# Patient Record
Sex: FEMALE | HISPANIC OR LATINO | URBAN - METROPOLITAN AREA
[De-identification: names, ages, dates, MRNs, and addresses within clinical notes are randomized per-mention and may not be internally consistent; named-entity substitution may affect disease eponyms.]

---

## 2019-02-14 ENCOUNTER — OFFICE VISIT (OUTPATIENT)
Dept: URBAN - METROPOLITAN AREA CLINIC 88 | Facility: CLINIC | Age: 84
End: 2019-02-14
Payer: COMMERCIAL

## 2019-02-14 DIAGNOSIS — H35.363 DRUSEN (DEGENERATIVE) OF MACULA, BILATERAL: ICD-10-CM

## 2019-02-14 PROCEDURE — 99214 OFFICE O/P EST MOD 30 MIN: CPT | Performed by: OPHTHALMOLOGY

## 2019-02-14 ASSESSMENT — INTRAOCULAR PRESSURE
OD: 14
OS: 14

## 2019-02-14 ASSESSMENT — KERATOMETRY
OS: 43.88
OD: 44.13

## 2019-02-14 NOTE — IMPRESSION/PLAN
Impression: Puckering of macula, right eye: H35.371. OD. Condition: established, stable. Symptoms: will continue to monitor. Plan: Discussed diagnosis in detail with patient. No treatment is required at this time. Patient was instructed to report any type of visual field changes or visual changes immediately.

## 2019-02-14 NOTE — IMPRESSION/PLAN
Impression: Drusen (degenerative) of macula, bilateral: H35.363. OU. Condition: established, stable. Plan: Advised patient of condition. Vitamins recommended.

## 2019-10-22 ENCOUNTER — OFFICE VISIT (OUTPATIENT)
Dept: URBAN - METROPOLITAN AREA CLINIC 88 | Facility: CLINIC | Age: 84
End: 2019-10-22
Payer: COMMERCIAL

## 2019-10-22 DIAGNOSIS — H02.409 PTOSIS OF EYELID: ICD-10-CM

## 2019-10-22 DIAGNOSIS — H02.839 DERMATOCHALASIS OF UNSPECIFIED EYE, UNSPECIFIED EYELID: Primary | ICD-10-CM

## 2019-10-22 PROCEDURE — 99213 OFFICE O/P EST LOW 20 MIN: CPT | Performed by: OPHTHALMOLOGY

## 2019-10-22 PROCEDURE — 99212 OFFICE O/P EST SF 10 MIN: CPT | Performed by: OPHTHALMOLOGY

## 2019-10-22 RX ORDER — ROPINIROLE 0.25 MG/1
0.25 MG TABLET ORAL
Qty: 0 | Refills: 0 | Status: ACTIVE
Start: 2019-10-22

## 2019-10-22 ASSESSMENT — INTRAOCULAR PRESSURE
OD: 12
OS: 12

## 2019-10-22 ASSESSMENT — VISUAL ACUITY
OD: 20/40
OS: 20/40

## 2019-10-22 NOTE — IMPRESSION/PLAN
Impression: Dermatochalasis of unspecified eye, unspecified eyelid: H02.839. Condition: unstable. Plan: Discussed diagnosis in detail with patient. Patient will return for a Visual field testing.  Once we obtain VF results, will need a referral to Plastic Surgeon

## 2019-12-03 ENCOUNTER — OFFICE VISIT (OUTPATIENT)
Dept: URBAN - METROPOLITAN AREA CLINIC 88 | Facility: CLINIC | Age: 84
End: 2019-12-03
Payer: COMMERCIAL

## 2019-12-03 PROCEDURE — 99213 OFFICE O/P EST LOW 20 MIN: CPT | Performed by: OPHTHALMOLOGY

## 2019-12-03 PROCEDURE — 92081 LIMITED VISUAL FIELD XM: CPT | Performed by: OPHTHALMOLOGY

## 2019-12-03 ASSESSMENT — INTRAOCULAR PRESSURE
OD: 14
OS: 14

## 2019-12-03 ASSESSMENT — KERATOMETRY
OD: 42.88
OS: 43.38

## 2019-12-03 ASSESSMENT — VISUAL ACUITY
OD: 20/30
OS: 20/30

## 2019-12-03 NOTE — IMPRESSION/PLAN
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 Condition: unstable. Symptoms: will continue to monitor. Plan: Discussed diagnosis in detail with patient.  Patient is given a referral to see a Plastic Surgeon to evaluate and treat Ptosis/Dermatochalasis OU

## 2020-10-09 ENCOUNTER — OFFICE VISIT (OUTPATIENT)
Dept: URBAN - METROPOLITAN AREA CLINIC 88 | Facility: CLINIC | Age: 85
End: 2020-10-09
Payer: COMMERCIAL

## 2020-10-09 DIAGNOSIS — H52.223 REGULAR ASTIGMATISM, BILATERAL: Primary | ICD-10-CM

## 2020-10-09 DIAGNOSIS — H35.371 PUCKERING OF MACULA, RIGHT EYE: Chronic | ICD-10-CM

## 2020-10-09 PROCEDURE — 92014 COMPRE OPH EXAM EST PT 1/>: CPT | Performed by: OPTOMETRIST

## 2020-10-09 ASSESSMENT — VISUAL ACUITY
OD: 20/30
OS: 20/30

## 2020-10-09 ASSESSMENT — INTRAOCULAR PRESSURE
OD: 12
OS: 12

## 2020-10-09 NOTE — IMPRESSION/PLAN
Impression: Nonexudative age-related macular degeneration: H35.3190. OU. Condition: established, stable. Symptoms: will continue to monitor. Plan: Discussed condition in detail with patient. Patient knows to return to clinic immediately if any changes in vision are experienced.

## 2020-11-19 ENCOUNTER — OFFICE VISIT (OUTPATIENT)
Dept: URBAN - METROPOLITAN AREA CLINIC 88 | Facility: CLINIC | Age: 85
End: 2020-11-19
Payer: COMMERCIAL

## 2020-11-19 DIAGNOSIS — H11.823 CONJUNCTIVOCHALASIS, BILATERAL: Primary | Chronic | ICD-10-CM

## 2020-11-19 PROCEDURE — 99213 OFFICE O/P EST LOW 20 MIN: CPT | Performed by: OPTOMETRIST

## 2020-11-19 ASSESSMENT — VISUAL ACUITY
OD: 20/40
OS: 20/40

## 2020-11-19 ASSESSMENT — INTRAOCULAR PRESSURE
OD: 13
OS: 12

## 2020-11-19 NOTE — IMPRESSION/PLAN
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 Plan: Discussed condition with patient in detail. Recommend treatment with Preservative Free Artificial tears QID. RTC if symptoms continue.

## 2020-11-19 NOTE — IMPRESSION/PLAN
Impression: Regular astigmatism, bilateral: H52.223. Plan: Prescription recheck today. Recommend pt return to optical for prescription redo.

## 2021-02-25 ENCOUNTER — OFFICE VISIT (OUTPATIENT)
Dept: URBAN - METROPOLITAN AREA CLINIC 88 | Facility: CLINIC | Age: 86
End: 2021-02-25
Payer: MEDICARE

## 2021-02-25 DIAGNOSIS — Z96.1 PRESENCE OF INTRAOCULAR LENS: ICD-10-CM

## 2021-02-25 DIAGNOSIS — H35.3190 NONEXUDATIVE AGE-RELATED MACULAR DEGENERATION: Primary | ICD-10-CM

## 2021-02-25 PROCEDURE — 92012 INTRM OPH EXAM EST PATIENT: CPT | Performed by: OPHTHALMOLOGY

## 2021-02-25 ASSESSMENT — INTRAOCULAR PRESSURE
OD: 13
OS: 13

## 2021-02-25 NOTE — IMPRESSION/PLAN
Impression: Drusen (degenerative) of macula, bilateral: H35.363 OU. Condition: established, stable. Plan: Advised patient of condition.  Use of vitamins has shown to improve the effects of ARMD.

## 2021-02-25 NOTE — IMPRESSION/PLAN
Impression: Nonexudative age-related macular degeneration: H35.3190 OU. Condition: established, stable. Symptoms: will continue to monitor. Plan: Macular degeneration, dry type with stable vision. Will continue to monitor vision and the patient has been instructed to call with any vision changes.  Use of vitamins has shown to improve the effects of ARMD.

## 2021-08-19 ENCOUNTER — OFFICE VISIT (OUTPATIENT)
Dept: URBAN - METROPOLITAN AREA CLINIC 88 | Facility: CLINIC | Age: 86
End: 2021-08-19
Payer: MEDICARE

## 2021-08-19 PROCEDURE — 92134 CPTRZ OPH DX IMG PST SGM RTA: CPT | Performed by: OPHTHALMOLOGY

## 2021-08-19 PROCEDURE — 99214 OFFICE O/P EST MOD 30 MIN: CPT | Performed by: OPHTHALMOLOGY

## 2021-08-19 ASSESSMENT — INTRAOCULAR PRESSURE
OD: 14
OS: 14